# Patient Record
(demographics unavailable — no encounter records)

---

## 2025-06-26 NOTE — REASON FOR VISIT
[Symptom and Test Evaluation] : symptom and test evaluation [CV Risk Factors and Non-Cardiac Disease] : CV risk factors and non-cardiac disease [Hyperlipidemia] : hyperlipidemia [Hypertension] : hypertension [Family Member] : family member [FreeTextEntry1] : Dear Dr.. Low:  I had the pleasure of evaluating your patient Mr. AFSHIN BUTCHER who presents for his initial cardiovascular and vascular medicine consultation.  He is a 78 yo man who is a current smoker with recent memory changes, DM2, HTN on ramipril, HLD on atorvastatin. BPH s/p prostate procedure.  He presents for his initial cardiac risk factor assessment in advance of elective vascular surgery.  On a recent CT scan, he was noted to have a distal abdominal aortic aneurysm that measures 4.0 cm with saccular/KIMBERLY morphology. Given appearance, the Vascular Surgery team has recommended endovascular repair.  A right renal mass was also noted on the CT scan.  From the cardiac perspective, the patient otherwise reports being asymptomatic without exertional limitations. Patient is a current smoker, and has been advised to stop.   No prior VTE or ATE. He is not on an antiplatelet or anticoagulant agent. NKDA My review of his 12-lead ECG notes NSR, normal axis, normal intervals. Unremarkable cardiac exam Appears euvolemic  Echocardiogram performed on 06/20/25 noted normal biventricular systolic function, mild diastolic dysfunction, PASP 26 mmHg (normal).  From the cardiac perspective, the patient is medically stable, and can proceed with vascular surgery (endovascular repair of AAA), without the need for additional cardiac testing or risk stratification.  If stable, he can f/u with me in six months.  Thank you for involving me in his care.  Warmest regards, Jonathan Garrido